# Patient Record
Sex: FEMALE
[De-identification: names, ages, dates, MRNs, and addresses within clinical notes are randomized per-mention and may not be internally consistent; named-entity substitution may affect disease eponyms.]

---

## 2023-10-02 PROBLEM — Z00.00 ENCOUNTER FOR PREVENTIVE HEALTH EXAMINATION: Status: ACTIVE | Noted: 2023-10-02

## 2023-11-01 ENCOUNTER — APPOINTMENT (OUTPATIENT)
Dept: ORTHOPEDIC SURGERY | Facility: CLINIC | Age: 48
End: 2023-11-01
Payer: COMMERCIAL

## 2023-11-01 VITALS — HEIGHT: 63 IN | WEIGHT: 220 LBS | BODY MASS INDEX: 38.98 KG/M2

## 2023-11-01 DIAGNOSIS — Z87.39 PERSONAL HISTORY OF OTHER DISEASES OF THE MUSCULOSKELETAL SYSTEM AND CONNECTIVE TISSUE: ICD-10-CM

## 2023-11-01 DIAGNOSIS — I10 ESSENTIAL (PRIMARY) HYPERTENSION: ICD-10-CM

## 2023-11-01 PROCEDURE — 20610 DRAIN/INJ JOINT/BURSA W/O US: CPT | Mod: RT

## 2023-11-01 PROCEDURE — 73564 X-RAY EXAM KNEE 4 OR MORE: CPT | Mod: RT

## 2023-11-01 PROCEDURE — 99203 OFFICE O/P NEW LOW 30 MIN: CPT | Mod: 25

## 2023-11-01 RX ORDER — ETANERCEPT 25 MG/.5ML
SOLUTION SUBCUTANEOUS
Refills: 0 | Status: ACTIVE | COMMUNITY

## 2023-11-01 RX ORDER — AMLODIPINE BESYLATE AND BENAZEPRIL HYDROCHLORIDE 10; 40 MG/1; MG/1
CAPSULE ORAL
Refills: 0 | Status: ACTIVE | COMMUNITY

## 2023-12-06 ENCOUNTER — APPOINTMENT (OUTPATIENT)
Dept: ORTHOPEDIC SURGERY | Facility: CLINIC | Age: 48
End: 2023-12-06
Payer: COMMERCIAL

## 2023-12-06 VITALS — BODY MASS INDEX: 39.87 KG/M2 | WEIGHT: 225 LBS | HEIGHT: 63 IN

## 2023-12-06 PROCEDURE — 20610 DRAIN/INJ JOINT/BURSA W/O US: CPT | Mod: RT

## 2023-12-06 PROCEDURE — 99213 OFFICE O/P EST LOW 20 MIN: CPT | Mod: 25

## 2023-12-13 ENCOUNTER — APPOINTMENT (OUTPATIENT)
Dept: ORTHOPEDIC SURGERY | Facility: CLINIC | Age: 48
End: 2023-12-13
Payer: COMMERCIAL

## 2023-12-13 PROCEDURE — 20610 DRAIN/INJ JOINT/BURSA W/O US: CPT | Mod: RT

## 2023-12-13 NOTE — ASSESSMENT
[FreeTextEntry1] : Underlying pathology reviewed and treatment options discussed. 11/01/2023: RT knee x-rays, 4 views, reveal 50% loss of medial joint space, mild to mod PF OA. Discussed and Patient elected to proceed with steroid injection.  Submitted Euflexxa auth. Activity modification as tolerated.  Questions addressed. Apply ice to affected area.  Follow up in 4-6 weeks.   12/06/2023: Treatment options reviewed. Discussed weight loss and activity modifications. Patient has been approved for Euflexxxa injections and would like to proceed. We discussed viscosupplementation injections and the risks, benefits, and alternatives to this course of treatment.  Patient demonstrated understanding of the treatment and elected to proceed with starting injection series.  Right knee Euflexxa #1 tolerated well. Return in 1 week to continue. Questions answered.  12/13/2023: Right knee Euflexxa #2 tolerated well. Ice if sore. RTO 1 week to continue.

## 2023-12-13 NOTE — PROCEDURE
[FreeTextEntry3] : Viscosupplementation Injection: X-ray evidence of osteoarthritis on this or prior visit and patient has tried OTC's including aspirin, Ibuprofen, Aleve etc or prescription NSAIDS, and/or exercises at home and/ or physical therapy without satisfactory response.   An injection of Euflexxa 2.5ml #2 was injected into the right knee(s) after verbal consent obtained.   Patient has tried OTC's including aspirin, Ibuprofen, Aleve etc or prescription NSAIDS, and/or exercises at home and/ or physical therapy without satisfactory response and Patient has decreased mobility in the joint. The risks, benefits, and alternatives to cortisone injection were explained in full to the patient. Risks outlined include but are not limited to infection, sepsis, bleeding, scarring, skin discoloration, temporary increase in pain, syncopal episode, failure to resolve symptoms, allergic reaction, and symptom recurrence. Patient understands the risks. All questions were answered. After discussion of options, patient requested an injection. Oral informed consent was obtained. Sterile technique was utilized for the procedure including the preparation of the solutions used for the injection. Injection site was prepped with betadine and alcohol. Ethyl chloride sprayed topically. Sterile technique used. Patient tolerated procedure well.   Post Procedure Instructions: Patient was advised to call if redness, pain, or fever occur. Apply ice for 15 min. every 2 hours for the next 12-24 hours as tolerated. Patient was advised to rest the joint(s) for 1-2 days.

## 2023-12-13 NOTE — HISTORY OF PRESENT ILLNESS
[Result of repetitive motion] : result of repetitive motion [8] : 8 [6] : 6 [Dull/Aching] : dull/aching [Localized] : localized [Throbbing] : throbbing [Occasional] : occasional [Leisure] : leisure [Heat] : heat [Walking] : walking [Stairs] : stairs [Full time] : Work status: full time [2] : 2 [Euflexxa] : Euflexxa [de-identified] : 12/13/2023: Right knee Euflexxa #2. Symptoms continue. Denies complication with prior injection.  12/06/2023: Follow up right knee. She had good relief for a week following CSI, but symptoms have returned.  11/1/2023: 47 yo F c/o right knee pain since July 2023. No injury. Pt was hiking at the time and noticed pain in the right knee. Was seen by PCP 1 month ago, was given MDP which helped for a few days. No previous injuries or surgeries to the right knee.  [] : Post Surgical Visit: no [FreeTextEntry1] : Right knee [FreeTextEntry5] : pain was better for a month after cortisone  [de-identified] :   [de-identified] : PCP [de-identified] : 12/6/23 [de-identified] : right knee

## 2023-12-20 ENCOUNTER — APPOINTMENT (OUTPATIENT)
Dept: ORTHOPEDIC SURGERY | Facility: CLINIC | Age: 48
End: 2023-12-20
Payer: COMMERCIAL

## 2023-12-20 DIAGNOSIS — M17.11 UNILATERAL PRIMARY OSTEOARTHRITIS, RIGHT KNEE: ICD-10-CM

## 2023-12-20 PROCEDURE — 20610 DRAIN/INJ JOINT/BURSA W/O US: CPT | Mod: RT

## 2023-12-20 NOTE — PROCEDURE
[FreeTextEntry3] : Viscosupplementation Injection: X-ray evidence of osteoarthritis on this or prior visit and patient has tried OTC's including aspirin, Ibuprofen, Aleve etc or prescription NSAIDS, and/or exercises at home and/ or physical therapy without satisfactory response.   An injection of Euflexxa 2.5ml #3 was injected into the right knee(s) after verbal consent obtained.   Patient has tried OTC's including aspirin, Ibuprofen, Aleve etc or prescription NSAIDS, and/or exercises at home and/ or physical therapy without satisfactory response and Patient has decreased mobility in the joint. The risks, benefits, and alternatives to cortisone injection were explained in full to the patient. Risks outlined include but are not limited to infection, sepsis, bleeding, scarring, skin discoloration, temporary increase in pain, syncopal episode, failure to resolve symptoms, allergic reaction, and symptom recurrence. Patient understands the risks. All questions were answered. After discussion of options, patient requested an injection. Oral informed consent was obtained. Sterile technique was utilized for the procedure including the preparation of the solutions used for the injection. Injection site was prepped with betadine and alcohol. Ethyl chloride sprayed topically. Sterile technique used. Patient tolerated procedure well.   Post Procedure Instructions: Patient was advised to call if redness, pain, or fever occur. Apply ice for 15 min. every 2 hours for the next 12-24 hours as tolerated. Patient was advised to rest the joint(s) for 1-2 days.

## 2023-12-20 NOTE — HISTORY OF PRESENT ILLNESS
[Result of repetitive motion] : result of repetitive motion [8] : 8 [6] : 6 [Dull/Aching] : dull/aching [Localized] : localized [Throbbing] : throbbing [Occasional] : occasional [Leisure] : leisure [Heat] : heat [Walking] : walking [Stairs] : stairs [Full time] : Work status: full time [3] : 3 [Euflexxa] : Euflexxa [de-identified] : 12/20/2023: Right knee Euflexxa #3. Symptoms continue. Denies complication with prior injection.  12/13/2023: Right knee Euflexxa #2. Symptoms continue. Denies complication with prior injection.  12/06/2023: Follow up right knee. She had good relief for a week following CSI, but symptoms have returned.  11/1/2023: 47 yo F c/o right knee pain since July 2023. No injury. Pt was hiking at the time and noticed pain in the right knee. Was seen by PCP 1 month ago, was given MDP which helped for a few days. No previous injuries or surgeries to the right knee.  [] : Post Surgical Visit: no [FreeTextEntry1] : Right knee [FreeTextEntry5] : pain was better for a month after cortisone  [de-identified] : PCP [de-identified] :   [de-identified] : 12/13/23 [de-identified] : right knee

## 2023-12-20 NOTE — ASSESSMENT
[FreeTextEntry1] : Underlying pathology reviewed and treatment options discussed. 11/01/2023: RT knee x-rays, 4 views, reveal 50% loss of medial joint space, mild to mod PF OA. Discussed and Patient elected to proceed with steroid injection.  Submitted Euflexxa auth. Activity modification as tolerated.  Questions addressed. Apply ice to affected area.  Follow up in 4-6 weeks.   12/06/2023: Treatment options reviewed. Discussed weight loss and activity modifications. Patient has been approved for Euflexxxa injections and would like to proceed. We discussed viscosupplementation injections and the risks, benefits, and alternatives to this course of treatment.  Patient demonstrated understanding of the treatment and elected to proceed with starting injection series.  Right knee Euflexxa #1 tolerated well. Return in 1 week to continue. Questions answered.  12/13/2023: Right knee Euflexxa #2 tolerated well. Ice if sore. RTO 1 week to continue.  12/20/2023: Right knee Euflexxa #3 tolerated well. Ice if sore. Follow up in 6 weeks if symptoms persist.   Progress note completed by JAYNA Power under the direct supervision of William Almanzar M.D.